# Patient Record
Sex: MALE | Race: OTHER | NOT HISPANIC OR LATINO
[De-identification: names, ages, dates, MRNs, and addresses within clinical notes are randomized per-mention and may not be internally consistent; named-entity substitution may affect disease eponyms.]

---

## 2017-02-03 ENCOUNTER — APPOINTMENT (OUTPATIENT)
Dept: HUMAN REPRODUCTION | Facility: CLINIC | Age: 42
End: 2017-02-03

## 2017-02-03 PROBLEM — Z00.00 ENCOUNTER FOR PREVENTIVE HEALTH EXAMINATION: Status: ACTIVE | Noted: 2017-02-03

## 2017-02-06 ENCOUNTER — APPOINTMENT (OUTPATIENT)
Dept: HUMAN REPRODUCTION | Facility: CLINIC | Age: 42
End: 2017-02-06

## 2017-02-23 ENCOUNTER — APPOINTMENT (OUTPATIENT)
Dept: FAMILY MEDICINE | Facility: CLINIC | Age: 42
End: 2017-02-23

## 2017-02-23 ENCOUNTER — MED ADMIN CHARGE (OUTPATIENT)
Age: 42
End: 2017-02-23

## 2017-02-23 VITALS
SYSTOLIC BLOOD PRESSURE: 112 MMHG | HEIGHT: 70.5 IN | BODY MASS INDEX: 24.67 KG/M2 | WEIGHT: 174.25 LBS | HEART RATE: 85 BPM | OXYGEN SATURATION: 98 % | DIASTOLIC BLOOD PRESSURE: 78 MMHG

## 2017-02-23 DIAGNOSIS — Z82.49 FAMILY HISTORY OF ISCHEMIC HEART DISEASE AND OTHER DISEASES OF THE CIRCULATORY SYSTEM: ICD-10-CM

## 2017-02-23 DIAGNOSIS — Z23 ENCOUNTER FOR IMMUNIZATION: ICD-10-CM

## 2017-02-23 DIAGNOSIS — Z80.49 FAMILY HISTORY OF MALIGNANT NEOPLASM OF OTHER GENITAL ORGANS: ICD-10-CM

## 2017-02-23 DIAGNOSIS — Z78.9 OTHER SPECIFIED HEALTH STATUS: ICD-10-CM

## 2017-02-23 DIAGNOSIS — R22.31 LOCALIZED SWELLING, MASS AND LUMP, RIGHT UPPER LIMB: ICD-10-CM

## 2017-02-23 DIAGNOSIS — Z87.891 PERSONAL HISTORY OF NICOTINE DEPENDENCE: ICD-10-CM

## 2017-02-23 DIAGNOSIS — R22.32 LOCALIZED SWELLING, MASS AND LUMP, LEFT UPPER LIMB: ICD-10-CM

## 2017-02-23 DIAGNOSIS — Z80.1 FAMILY HISTORY OF MALIGNANT NEOPLASM OF TRACHEA, BRONCHUS AND LUNG: ICD-10-CM

## 2019-09-20 ENCOUNTER — TRANSCRIPTION ENCOUNTER (OUTPATIENT)
Age: 44
End: 2019-09-20

## 2019-11-09 ENCOUNTER — TRANSCRIPTION ENCOUNTER (OUTPATIENT)
Age: 44
End: 2019-11-09

## 2020-07-24 ENCOUNTER — TRANSCRIPTION ENCOUNTER (OUTPATIENT)
Age: 45
End: 2020-07-24

## 2022-05-24 ENCOUNTER — APPOINTMENT (OUTPATIENT)
Dept: OTOLARYNGOLOGY | Facility: CLINIC | Age: 47
End: 2022-05-24

## 2022-05-26 ENCOUNTER — APPOINTMENT (OUTPATIENT)
Dept: OTOLARYNGOLOGY | Facility: CLINIC | Age: 47
End: 2022-05-26
Payer: COMMERCIAL

## 2022-05-26 ENCOUNTER — TRANSCRIPTION ENCOUNTER (OUTPATIENT)
Age: 47
End: 2022-05-26

## 2022-05-26 VITALS
WEIGHT: 172 LBS | DIASTOLIC BLOOD PRESSURE: 80 MMHG | BODY MASS INDEX: 24.62 KG/M2 | SYSTOLIC BLOOD PRESSURE: 120 MMHG | HEIGHT: 70 IN | HEART RATE: 74 BPM

## 2022-05-26 DIAGNOSIS — C85.90 NON-HODGKIN LYMPHOMA, UNSPECIFIED, UNSPECIFIED SITE: ICD-10-CM

## 2022-05-26 PROCEDURE — 31231 NASAL ENDOSCOPY DX: CPT

## 2022-05-26 PROCEDURE — 99204 OFFICE O/P NEW MOD 45 MIN: CPT | Mod: 25

## 2022-05-26 RX ORDER — NAPROXEN 500 MG/1
500 TABLET ORAL
Qty: 42 | Refills: 0 | Status: DISCONTINUED | COMMUNITY
Start: 2022-02-14

## 2022-05-26 RX ORDER — DOXYCYCLINE HYCLATE 100 MG/1
100 TABLET ORAL
Qty: 28 | Refills: 0 | Status: DISCONTINUED | COMMUNITY
Start: 2021-12-13

## 2022-05-26 RX ORDER — METHOCARBAMOL 500 MG/1
500 TABLET, FILM COATED ORAL
Qty: 63 | Refills: 0 | Status: DISCONTINUED | COMMUNITY
Start: 2022-02-14

## 2022-05-26 RX ORDER — MUPIROCIN 20 MG/G
2 OINTMENT TOPICAL
Qty: 30 | Refills: 0 | Status: DISCONTINUED | COMMUNITY
Start: 2022-04-29

## 2022-05-26 RX ORDER — HYDROCODONE BITARTRATE AND ACETAMINOPHEN 5; 325 MG/1; MG/1
5-325 TABLET ORAL
Qty: 12 | Refills: 0 | Status: DISCONTINUED | COMMUNITY
Start: 2021-12-13

## 2022-05-26 RX ORDER — TOPIRAMATE 25 MG/1
25 CAPSULE, EXTENDED RELEASE ORAL
Qty: 30 | Refills: 0 | Status: DISCONTINUED | COMMUNITY
Start: 2022-03-31

## 2022-05-26 RX ORDER — FLUCONAZOLE 200 MG/1
200 TABLET ORAL
Qty: 60 | Refills: 0 | Status: ACTIVE | COMMUNITY
Start: 2022-01-11

## 2022-05-26 RX ORDER — PREDNISONE 5 MG/1
5 TABLET ORAL
Qty: 84 | Refills: 0 | Status: DISCONTINUED | COMMUNITY
Start: 2022-03-11

## 2022-05-26 RX ORDER — BUDESONIDE 0.5 MG/2ML
0.5 INHALANT ORAL
Qty: 360 | Refills: 0 | Status: ACTIVE | COMMUNITY
Start: 2022-04-17

## 2022-05-26 RX ORDER — PREDNISONE 10 MG/1
10 TABLET ORAL
Qty: 50 | Refills: 0 | Status: DISCONTINUED | COMMUNITY
Start: 2022-01-19

## 2022-05-26 RX ORDER — GABAPENTIN 100 MG/1
100 CAPSULE ORAL
Qty: 126 | Refills: 0 | Status: ACTIVE | COMMUNITY
Start: 2022-03-31

## 2022-05-26 RX ORDER — METHYLPREDNISOLONE 4 MG/1
4 TABLET ORAL
Qty: 21 | Refills: 0 | Status: DISCONTINUED | COMMUNITY
Start: 2021-12-13

## 2022-05-26 RX ORDER — LEVOFLOXACIN 500 MG/1
500 TABLET, FILM COATED ORAL
Qty: 14 | Refills: 0 | Status: DISCONTINUED | COMMUNITY
Start: 2022-01-19

## 2022-05-26 NOTE — HISTORY OF PRESENT ILLNESS
[de-identified] : 46 year old male  presents for evaluation of chronic sinusitis\par Referred by ENT Dr Encinas\par Underwent ESS in Dec 2021 including R max/ethmoid/sphenoid, L max, septoplasty-- experienced improvement in breathing following surgery but recurrent R-sided infections characterized by significant mucopurulent secretions\par Has been on multiple PO abx including levaquin, doxy with only transient improvement\par History of lymphoma, finished chemo treatments Aug 2017, noticed after treatments sinusitis worsened  \par States facial  pressure and pain, nasal congestion, anterior rhinorrhea or PND,\par Sense of smell is good/poor\par Recurrent sinus infections: yes\par Duration last 2-3 weeks sometime longer \par Denies the use of OT allergy medication or nasal sprays\par Scans: CT pre and post-op-- reviewed personally-- pre-op with R>L max, sphenoethmoid disease, severe L DNS, old R orbital floor fracture\par Uses Neti pot 2x a day with budesonide and antibacterial paste \par Currently using Mucinex to help with sinus pressure

## 2022-05-26 NOTE — CONSULT LETTER
[Dear  ___] : Dear  [unfilled], [( Thank you for referring [unfilled] for consultation for _____ )] : Thank you for referring [unfilled] for consultation for [unfilled] [Please see my note below.] : Please see my note below. [Consult Closing:] : Thank you very much for allowing me to participate in the care of this patient.  If you have any questions, please do not hesitate to contact me. [Sincerely,] : Sincerely, [FreeTextEntry3] : Steven Monson MD\par Sinus & Endoscopic Skull Base Surgery\par Department of Otolaryngology- Head & Neck Surgery\par [facility]\par [facility]\par \par Phone: [phone]\par Fax: [phone]\par

## 2022-06-06 ENCOUNTER — NON-APPOINTMENT (OUTPATIENT)
Age: 47
End: 2022-06-06

## 2022-06-06 LAB — EAR NOSE AND THROAT CULTURE: ABNORMAL

## 2022-07-12 ENCOUNTER — APPOINTMENT (OUTPATIENT)
Dept: OTOLARYNGOLOGY | Facility: CLINIC | Age: 47
End: 2022-07-12

## 2022-07-12 ENCOUNTER — LABORATORY RESULT (OUTPATIENT)
Age: 47
End: 2022-07-12

## 2022-07-12 VITALS
WEIGHT: 168 LBS | HEART RATE: 87 BPM | HEIGHT: 70 IN | SYSTOLIC BLOOD PRESSURE: 147 MMHG | BODY MASS INDEX: 24.05 KG/M2 | DIASTOLIC BLOOD PRESSURE: 92 MMHG

## 2022-07-12 DIAGNOSIS — J32.9 CHRONIC SINUSITIS, UNSPECIFIED: ICD-10-CM

## 2022-07-12 PROCEDURE — 99214 OFFICE O/P EST MOD 30 MIN: CPT | Mod: 25

## 2022-07-12 PROCEDURE — 31231 NASAL ENDOSCOPY DX: CPT

## 2022-07-12 RX ORDER — SUMATRIPTAN 100 MG/1
100 TABLET, FILM COATED ORAL
Qty: 6 | Refills: 0 | Status: ACTIVE | COMMUNITY
Start: 2022-06-09

## 2022-07-12 RX ORDER — DICLOFENAC SODIUM 75 MG/1
75 TABLET, DELAYED RELEASE ORAL
Qty: 42 | Refills: 0 | Status: DISCONTINUED | COMMUNITY
Start: 2022-05-12 | End: 2022-07-12

## 2022-07-12 RX ORDER — SULFAMETHOXAZOLE AND TRIMETHOPRIM 800; 160 MG/1; MG/1
800-160 TABLET ORAL TWICE DAILY
Qty: 14 | Refills: 0 | Status: DISCONTINUED | COMMUNITY
Start: 2022-06-06 | End: 2022-07-12

## 2022-07-12 RX ORDER — CIPROFLOXACIN HCL 100 %
POWDER (GRAM) MISCELLANEOUS
Qty: 1 | Refills: 0 | Status: DISCONTINUED | COMMUNITY
Start: 2022-05-26 | End: 2022-07-12

## 2022-07-12 RX ORDER — BUDESONIDE, MICRONIZED 100 %
POWDER (GRAM) MISCELLANEOUS
Refills: 0 | Status: ACTIVE | COMMUNITY

## 2022-07-12 NOTE — HISTORY OF PRESENT ILLNESS
[de-identified] : 46 year old male hx recurrent R CRS presents for follow up \par hx ESS in Dec 2021 including R max/ethmoid/sphenoid, L max, septoplasty with Dr Encinas\par LCV 05/26/22 at which time started on Ciprofloxacin rinses \par Culture showed Few Haemophilus influenzae, few Streptococcus pneumoniae- completed Bactrim and Cipro rinses with relief \par Reports increased nasal congestion R>L for the past week, PND with yellow/green phlegm, right maxillary pressure \par Sense of smell is poor- occasional foul odor \par Denies recent fevers, sinus infections \par Using saline rinses with Budesonide 2x a day

## 2022-07-20 ENCOUNTER — APPOINTMENT (OUTPATIENT)
Dept: CT IMAGING | Facility: CLINIC | Age: 47
End: 2022-07-20

## 2022-07-20 PROCEDURE — 70486 CT MAXILLOFACIAL W/O DYE: CPT

## 2022-08-16 ENCOUNTER — APPOINTMENT (OUTPATIENT)
Dept: OTOLARYNGOLOGY | Facility: CLINIC | Age: 47
End: 2022-08-16

## 2023-12-22 ENCOUNTER — OFFICE (OUTPATIENT)
Facility: LOCATION | Age: 48
Setting detail: OPHTHALMOLOGY
End: 2023-12-22
Payer: COMMERCIAL

## 2023-12-22 DIAGNOSIS — H01.001: ICD-10-CM

## 2023-12-22 DIAGNOSIS — H52.4: ICD-10-CM

## 2023-12-22 DIAGNOSIS — H01.004: ICD-10-CM

## 2023-12-22 DIAGNOSIS — H16.223: ICD-10-CM

## 2023-12-22 DIAGNOSIS — H25.13: ICD-10-CM

## 2023-12-22 DIAGNOSIS — H35.443: ICD-10-CM

## 2023-12-22 PROBLEM — H43.393 VITREOUS FLOATERS; BOTH EYES: Status: ACTIVE | Noted: 2023-12-22

## 2023-12-22 PROBLEM — H31.29 PERIPAPILLARY ATROPHY ; BOTH EYES: Status: ACTIVE | Noted: 2023-12-22

## 2023-12-22 PROCEDURE — 92015 DETERMINE REFRACTIVE STATE: CPT | Performed by: OPHTHALMOLOGY

## 2023-12-22 PROCEDURE — 92014 COMPRE OPH EXAM EST PT 1/>: CPT | Performed by: OPHTHALMOLOGY

## 2023-12-22 PROCEDURE — 92250 FUNDUS PHOTOGRAPHY W/I&R: CPT | Performed by: OPHTHALMOLOGY

## 2023-12-22 ASSESSMENT — SUPERFICIAL PUNCTATE KERATITIS (SPK)
OD_SPK: 1+
OS_SPK: 1+

## 2023-12-22 ASSESSMENT — CONFRONTATIONAL VISUAL FIELD TEST (CVF)
OS_FINDINGS: FULL
OD_FINDINGS: FULL

## 2023-12-22 ASSESSMENT — LID EXAM ASSESSMENTS
OD_BLEPHARITIS: RUL 1+
OS_BLEPHARITIS: LUL 1+

## 2023-12-27 PROBLEM — H35.443 AGE-RELATED RETICULAR DEGENERATION OF RETINA; BOTH EYES: Status: ACTIVE | Noted: 2023-12-22

## 2023-12-27 PROBLEM — H52.13 MYOPIA: Status: ACTIVE | Noted: 2023-12-22

## 2023-12-27 ASSESSMENT — REFRACTION_AUTOREFRACTION
OS_CYLINDER: +1.25
OD_CYLINDER: +1.25
OS_AXIS: 171
OS_SPHERE: -1.25
OD_AXIS: 158
OD_SPHERE: -1.25

## 2023-12-27 ASSESSMENT — REFRACTION_MANIFEST
OD_CYLINDER: +1.25
OD_SPHERE: -1.50
OS_VA1: 20/20
OS_ADD: +1.00
OD_ADD: +1.00
OS_CYLINDER: +1.25
OS_AXIS: 170
OD_VA1: 20/20
OS_SPHERE: -1.25
OS_VA2: 20/20(J1+)
OD_VA2: 20/20(J1+)
OD_AXIS: 156

## 2023-12-27 ASSESSMENT — REFRACTION_CURRENTRX
OD_CYLINDER: +1.00
OD_SPHERE: -1.25
OS_AXIS: 180
OS_OVR_VA: 20/
OS_CYLINDER: +1.00
OD_OVR_VA: 20/
OS_SPHERE: -1.00
OD_AXIS: 156

## 2023-12-27 ASSESSMENT — SPHEQUIV_DERIVED
OD_SPHEQUIV: -0.625
OS_SPHEQUIV: -0.625
OS_SPHEQUIV: -0.625
OD_SPHEQUIV: -0.875

## 2025-02-04 ENCOUNTER — OFFICE (OUTPATIENT)
Facility: LOCATION | Age: 50
Setting detail: OPHTHALMOLOGY
End: 2025-02-04
Payer: COMMERCIAL

## 2025-02-04 DIAGNOSIS — H52.531: ICD-10-CM

## 2025-02-04 DIAGNOSIS — H25.13: ICD-10-CM

## 2025-02-04 DIAGNOSIS — H16.223: ICD-10-CM

## 2025-02-04 DIAGNOSIS — H35.443: ICD-10-CM

## 2025-02-04 DIAGNOSIS — H43.393: ICD-10-CM

## 2025-02-04 PROCEDURE — 92014 COMPRE OPH EXAM EST PT 1/>: CPT | Performed by: OPHTHALMOLOGY

## 2025-02-04 PROCEDURE — 92250 FUNDUS PHOTOGRAPHY W/I&R: CPT | Performed by: OPHTHALMOLOGY

## 2025-02-04 ASSESSMENT — SUPERFICIAL PUNCTATE KERATITIS (SPK)
OS_SPK: 1+
OD_SPK: 1+

## 2025-02-04 ASSESSMENT — LID EXAM ASSESSMENTS
OS_BLEPHARITIS: LUL 1+
OD_BLEPHARITIS: RUL 1+

## 2025-02-04 ASSESSMENT — CONFRONTATIONAL VISUAL FIELD TEST (CVF)
OS_FINDINGS: FULL
OD_FINDINGS: FULL

## 2025-02-04 ASSESSMENT — TONOMETRY
OS_IOP_MMHG: 14
OD_IOP_MMHG: 14

## 2025-02-06 ASSESSMENT — REFRACTION_CURRENTRX
OS_CYLINDER: +1.25
OD_AXIS: 161
OD_ADD: +1.00
OS_OVR_VA: 20/
OS_AXIS: 169
OS_ADD: +1.00
OD_CYLINDER: +1.25
OS_VPRISM_DIRECTION: PROGS
OD_OVR_VA: 20/
OD_VPRISM_DIRECTION: PROGS
OS_SPHERE: -1.25
OD_SPHERE: -1.50

## 2025-02-06 ASSESSMENT — REFRACTION_MANIFEST
OS_VA1: 20/20
OS_AXIS: 170
OD_CYLINDER: +1.25
OD_VA1: 20/20
OD_SPHERE: -1.50
OD_AXIS: 156
OS_CYLINDER: +1.25
OS_SPHERE: -1.25
OD_VA2: 20/20(J1+)
OS_VA2: 20/20(J1+)
OD_ADD: +1.00
OS_ADD: +1.00

## 2025-02-06 ASSESSMENT — REFRACTION_AUTOREFRACTION
OD_CYLINDER: +1.50
OS_CYLINDER: +1.25
OS_AXIS: 176
OD_SPHERE: -1.00
OD_AXIS: 163
OS_SPHERE: -1.00

## 2025-02-06 ASSESSMENT — VISUAL ACUITY
OD_BCVA: 20/20
OS_BCVA: 20/20

## 2025-08-22 ENCOUNTER — OFFICE (OUTPATIENT)
Facility: LOCATION | Age: 50
Setting detail: OPHTHALMOLOGY
End: 2025-08-22
Payer: COMMERCIAL

## 2025-08-22 DIAGNOSIS — H43.812: ICD-10-CM

## 2025-08-22 DIAGNOSIS — H25.13: ICD-10-CM

## 2025-08-22 DIAGNOSIS — H16.223: ICD-10-CM

## 2025-08-22 DIAGNOSIS — H43.392: ICD-10-CM

## 2025-08-22 PROCEDURE — 92250 FUNDUS PHOTOGRAPHY W/I&R: CPT | Performed by: OPHTHALMOLOGY

## 2025-08-22 PROCEDURE — 92014 COMPRE OPH EXAM EST PT 1/>: CPT | Performed by: OPHTHALMOLOGY

## 2025-08-22 ASSESSMENT — CONFRONTATIONAL VISUAL FIELD TEST (CVF)
OD_FINDINGS: FULL
OS_FINDINGS: FULL

## 2025-08-22 ASSESSMENT — SUPERFICIAL PUNCTATE KERATITIS (SPK)
OS_SPK: 1+
OD_SPK: 1+

## 2025-08-22 ASSESSMENT — LID EXAM ASSESSMENTS
OS_BLEPHARITIS: LUL 1+
OD_BLEPHARITIS: RUL 1+

## 2025-08-28 ASSESSMENT — REFRACTION_CURRENTRX
OS_ADD: +1.00
OS_AXIS: 170
OD_ADD: +1.00
OD_VPRISM_DIRECTION: PROGS
OS_VPRISM_DIRECTION: PROGS
OS_CYLINDER: +1.25
OS_OVR_VA: 20/
OD_AXIS: 160
OD_OVR_VA: 20/
OD_SPHERE: -1.50
OD_CYLINDER: +1.25
OS_SPHERE: -1.25

## 2025-08-28 ASSESSMENT — REFRACTION_AUTOREFRACTION
OD_AXIS: 161
OS_AXIS: 174
OD_CYLINDER: +1.25
OS_CYLINDER: +1.50
OS_SPHERE: -1.00
OD_SPHERE: -1.00

## 2025-08-28 ASSESSMENT — REFRACTION_MANIFEST
OS_CYLINDER: +1.25
OD_CYLINDER: +1.25
OS_VA2: 20/20(J1+)
OD_ADD: +1.00
OS_VA1: 20/20
OS_AXIS: 170
OD_AXIS: 156
OD_VA1: 20/20
OD_VA2: 20/20(J1+)
OS_SPHERE: -1.25
OD_SPHERE: -1.50
OS_ADD: +1.00

## 2025-08-28 ASSESSMENT — VISUAL ACUITY
OS_BCVA: 20/20
OD_BCVA: 20/20